# Patient Record
Sex: FEMALE | Race: WHITE | NOT HISPANIC OR LATINO | Employment: PART TIME | URBAN - METROPOLITAN AREA
[De-identification: names, ages, dates, MRNs, and addresses within clinical notes are randomized per-mention and may not be internally consistent; named-entity substitution may affect disease eponyms.]

---

## 2017-02-18 ENCOUNTER — COMMUNICATION - HEALTHEAST (OUTPATIENT)
Dept: FAMILY MEDICINE | Facility: CLINIC | Age: 37
End: 2017-02-18

## 2017-02-18 DIAGNOSIS — R45.86 MOOD CHANGES: ICD-10-CM

## 2017-06-21 ENCOUNTER — COMMUNICATION - HEALTHEAST (OUTPATIENT)
Dept: FAMILY MEDICINE | Facility: CLINIC | Age: 37
End: 2017-06-21

## 2017-06-21 DIAGNOSIS — R45.86 MOOD CHANGES: ICD-10-CM

## 2017-10-19 ENCOUNTER — COMMUNICATION - HEALTHEAST (OUTPATIENT)
Dept: FAMILY MEDICINE | Facility: CLINIC | Age: 37
End: 2017-10-19

## 2017-10-19 DIAGNOSIS — R45.86 MOOD CHANGES: ICD-10-CM

## 2019-07-07 ENCOUNTER — ANESTHESIA - HEALTHEAST (OUTPATIENT)
Dept: OBGYN | Facility: HOSPITAL | Age: 39
End: 2019-07-07

## 2019-07-07 ENCOUNTER — RECORDS - HEALTHEAST (OUTPATIENT)
Dept: ADMINISTRATIVE | Facility: OTHER | Age: 39
End: 2019-07-07

## 2019-07-09 ENCOUNTER — HOME CARE/HOSPICE - HEALTHEAST (OUTPATIENT)
Dept: HOME HEALTH SERVICES | Facility: HOME HEALTH | Age: 39
End: 2019-07-09

## 2019-07-11 ENCOUNTER — HOME CARE/HOSPICE - HEALTHEAST (OUTPATIENT)
Dept: HOME HEALTH SERVICES | Facility: HOME HEALTH | Age: 39
End: 2019-07-11

## 2019-07-12 ENCOUNTER — COMMUNICATION - HEALTHEAST (OUTPATIENT)
Dept: OBGYN | Facility: HOSPITAL | Age: 39
End: 2019-07-12

## 2021-05-30 NOTE — ANESTHESIA POSTPROCEDURE EVALUATION
Patient: Kathy Gross  * No procedures listed *  Anesthesia type: ITN    Patient location: Labor and Delivery  Last vitals: No vitals data found for the desired time range.    Post vital signs: stable  Level of consciousness: awake and responds to simple questions  Post-anesthesia pain: pain controlled  Post-anesthesia nausea and vomiting: no  Pulmonary: unassisted, return to baseline  Cardiovascular: stable and blood pressure at baseline  Hydration: adequate  Anesthetic events: no    QCDR Measures:  ASA# 11 - Hanna-op Cardiac Arrest: ASA11B - Patient did NOT experience unanticipated cardiac arrest  ASA# 12 - Hanna-op Mortality Rate: ASA12B - Patient did NOT die  ASA# 13 - PACU Re-Intubation Rate: ASA13B - Patient did NOT require a new airway mgmt  ASA# 10 - Composite Anes Safety: ASA10A - No serious adverse event    Additional Notes:  Kathy Gross  The patient is status post ITN placement.  The effects of the ITN have worn off.  The patient has no headache, and no back pain.  There is no need for follow up.

## 2021-05-30 NOTE — ANESTHESIA PROCEDURE NOTES
Spinal Block    Patient location during procedure: OB  Start time: 7/7/2019 10:28 PM  End time: 7/7/2019 10:31 PM  Reason for block: labor epidural    Staffing:  Performing  Anesthesiologist: Darion Garcia MD    Preanesthetic Checklist  Completed: patient identified, risks, benefits, and alternatives discussed, timeout performed, consent obtained, at patient's request, airway assessed, oxygen available, suction available, emergency drugs available and hand hygiene performed  Spinal Block  Patient position: right lateral decubitus  Prep: ChloraPrep and site prepped and draped  Patient monitoring: heart rate, cardiac monitor, continuous pulse ox and blood pressure  Approach: midline  Location: L4-5  Injection technique: single-shot  Needle type: pencil-tip   Needle gauge: 24 G

## 2021-05-30 NOTE — TELEPHONE ENCOUNTER
OB Follow Up Phone Call    Patient: Kathy Gross  : 1980  MRN: [id number]     Location  [facility]  Provider Akilah Aguero MD      :   N/A    Language:   English    Discharge Follow-Up:  Follow-Up call by Outreach nurse: Message left for patient    Type of Delivery:      Feeding Method:  Breastfeeding    Comments:   Left message with [facility] Outreach phone number for patient to call back if desired. Reminded patient to schedule postpartum follow-up appointment as directed by PCP at discharge.  Encouraged patient to call clinic/PCP with questions or concerns.    Completed by:   Milvia Christine RN      Patient: Kathy Gross  : 1980  MRN: [id number]

## 2021-05-30 NOTE — ANESTHESIA PREPROCEDURE EVALUATION
Anesthesia Evaluation      Patient summary reviewed   No history of anesthetic complications     Airway   Mallampati: II  Neck ROM: full   Pulmonary - negative ROS and normal exam                          Cardiovascular - negative ROS and normal exam   Neuro/Psych      Comments: migraines    Endo/Other    (+) pregnant     GI/Hepatic/Renal - negative ROS      Other findings: , about to push, requesting ITN      Dental - normal exam                        Anesthesia Plan  Planned anesthetic: ITN    ASA 2     Anesthetic plan and risks discussed with: patient and spouse    Post-op plan: routine recovery

## 2021-05-31 ENCOUNTER — RECORDS - HEALTHEAST (OUTPATIENT)
Dept: ADMINISTRATIVE | Facility: CLINIC | Age: 41
End: 2021-05-31

## 2021-06-01 ENCOUNTER — RECORDS - HEALTHEAST (OUTPATIENT)
Dept: ADMINISTRATIVE | Facility: CLINIC | Age: 41
End: 2021-06-01

## 2021-06-02 ENCOUNTER — RECORDS - HEALTHEAST (OUTPATIENT)
Dept: ADMINISTRATIVE | Facility: CLINIC | Age: 41
End: 2021-06-02

## 2021-07-03 NOTE — ADDENDUM NOTE
Addendum Note by Shazia Hyman at 7/16/2019  3:30 PM     Author: Shazia Hyman Service: -- Author Type: Patient Access    Filed: 7/16/2019  3:30 PM Date of Service: 7/16/2019  3:30 PM Status: Signed    : Shazia Hyman (Patient Access)       Addendum  created 07/16/19 1530 by Shazia Hyman    [facility] saved

## 2021-07-03 NOTE — ADDENDUM NOTE
Addendum Note by Shazia Hyman at 7/15/2019  1:58 PM     Author: Shazia Hyman Service: -- Author Type: Patient Access    Filed: 7/15/2019  1:58 PM Date of Service: 7/15/2019  1:58 PM Status: Signed    : Shazia Hyman (Patient Access)       Addendum  created 07/15/19 1358 by Shazia Hyman    [facility] saved

## 2021-07-03 NOTE — ADDENDUM NOTE
Addendum Note by Asaf Sethi MD at 7/22/2019  1:29 PM     Author: Asaf Sethi MD Service: -- Author Type: Physician    Filed: 7/22/2019  1:29 PM Date of Service: 7/22/2019  1:29 PM Status: Signed    : Asaf Sethi MD (Physician)       Addendum  created 07/22/19 1329 by Asaf Sethi MD    Intraprocedure Blocks edited, Sign clinical note

## 2021-07-03 NOTE — ADDENDUM NOTE
Addendum Note by Shazia Hyman at 7/11/2019 12:26 PM     Author: Shazia Hyman Service: -- Author Type: Patient Access    Filed: 7/11/2019 12:26 PM Date of Service: 7/11/2019 12:26 PM Status: Signed    : Shazia Hyman (Patient Access)       Addendum  created 07/11/19 1226 by Shazia Hyman    [facility] saved

## 2021-07-03 NOTE — ADDENDUM NOTE
Addendum Note by Shazia Hyman at 7/22/2019  2:56 PM     Author: Shzaia Hyman Service: -- Author Type: Patient Access    Filed: 7/22/2019  2:56 PM Date of Service: 7/22/2019  2:56 PM Status: Signed    : Shazia Hyman (Patient Access)       Addendum  created 07/22/19 1456 by Shazia Hyman    [facility] saved

## 2021-07-03 NOTE — ADDENDUM NOTE
Addendum Note by Slick Brooks MD at 7/16/2019  9:56 AM     Author: Slick Brooks MD Service: -- Author Type: Physician    Filed: 7/16/2019  9:56 AM Date of Service: 7/16/2019  9:56 AM Status: Signed    : Slick Brooks MD (Physician)       Addendum  created 07/16/19 0956 by Slick Brooks MD    Child order released for a procedure order, Order Canceled from Note

## 2022-09-13 ENCOUNTER — LAB REQUISITION (OUTPATIENT)
Dept: LAB | Facility: CLINIC | Age: 42
End: 2022-09-13

## 2022-09-13 PROCEDURE — 86481 TB AG RESPONSE T-CELL SUSP: CPT | Performed by: INTERNAL MEDICINE

## 2022-09-15 LAB
GAMMA INTERFERON BACKGROUND BLD IA-ACNC: 0.03 IU/ML
M TB IFN-G BLD-IMP: NEGATIVE
M TB IFN-G CD4+ BCKGRND COR BLD-ACNC: 9.97 IU/ML
MITOGEN IGNF BCKGRD COR BLD-ACNC: -0.01 IU/ML
MITOGEN IGNF BCKGRD COR BLD-ACNC: 0.01 IU/ML
QUANTIFERON MITOGEN: 10 IU/ML
QUANTIFERON NIL TUBE: 0.03 IU/ML
QUANTIFERON TB1 TUBE: 0.04 IU/ML
QUANTIFERON TB2 TUBE: 0.02

## 2023-04-05 ENCOUNTER — LAB REQUISITION (OUTPATIENT)
Dept: LAB | Facility: CLINIC | Age: 43
End: 2023-04-05

## 2023-04-05 DIAGNOSIS — Z01.419 ENCOUNTER FOR GYNECOLOGICAL EXAMINATION (GENERAL) (ROUTINE) WITHOUT ABNORMAL FINDINGS: ICD-10-CM

## 2023-04-05 PROCEDURE — 87624 HPV HI-RISK TYP POOLED RSLT: CPT | Performed by: OBSTETRICS & GYNECOLOGY

## 2023-04-05 PROCEDURE — G0145 SCR C/V CYTO,THINLAYER,RESCR: HCPCS | Performed by: OBSTETRICS & GYNECOLOGY

## 2023-04-07 LAB
BKR LAB AP GYN ADEQUACY: NORMAL
BKR LAB AP GYN INTERPRETATION: NORMAL
BKR LAB AP HPV REFLEX: NORMAL
BKR LAB AP LMP: NORMAL
BKR LAB AP PREVIOUS ABNL DX: NORMAL
BKR LAB AP PREVIOUS ABNORMAL: NORMAL
PATH REPORT.COMMENTS IMP SPEC: NORMAL
PATH REPORT.COMMENTS IMP SPEC: NORMAL
PATH REPORT.RELEVANT HX SPEC: NORMAL

## 2023-04-11 LAB
HUMAN PAPILLOMA VIRUS 16 DNA: NEGATIVE
HUMAN PAPILLOMA VIRUS 18 DNA: NEGATIVE
HUMAN PAPILLOMA VIRUS FINAL DIAGNOSIS: NORMAL
HUMAN PAPILLOMA VIRUS OTHER HR: NEGATIVE

## 2023-09-08 ENCOUNTER — IMMUNIZATION (OUTPATIENT)
Dept: FAMILY MEDICINE | Facility: CLINIC | Age: 43
End: 2023-09-08
Payer: COMMERCIAL

## 2023-09-08 DIAGNOSIS — Z23 ENCOUNTER FOR IMMUNIZATION: Primary | ICD-10-CM

## 2023-09-08 PROCEDURE — 99207 PR NO CHARGE NURSE ONLY: CPT

## 2023-09-08 PROCEDURE — 90471 IMMUNIZATION ADMIN: CPT

## 2023-09-08 PROCEDURE — 90686 IIV4 VACC NO PRSV 0.5 ML IM: CPT

## 2023-09-08 NOTE — PROGRESS NOTES
Prior to immunization administration, verified patients identity using patient s name and date of birth. Please see Immunization Activity for additional information.     Screening Questionnaire for Adult Immunization    Are you sick today?   No   Do you have allergies to medications, food, a vaccine component or latex?   No   Have you ever had a serious reaction after receiving a vaccination?   No   Do you have a long-term health problem with heart, lung, kidney, or metabolic disease (e.g., diabetes), asthma, a blood disorder, no spleen, complement component deficiency, a cochlear implant, or a spinal fluid leak?  Are you on long-term aspirin therapy?   No   Do you have cancer, leukemia, HIV/AIDS, or any other immune system problem?   No   Do you have a parent, brother, or sister with an immune system problem?   No   In the past 3 months, have you taken medications that affect  your immune system, such as prednisone, other steroids, or anticancer drugs; drugs for the treatment of rheumatoid arthritis, Crohn s disease, or psoriasis; or have you had radiation treatments?   No   Have you had a seizure, or a brain or other nervous system problem?   No   During the past year, have you received a transfusion of blood or blood    products, or been given immune (gamma) globulin or antiviral drug?   No   For women: Are you pregnant or is there a chance you could become       pregnant during the next month?   No   Have you received any vaccinations in the past 4 weeks?   No     Immunization questionnaire answers were all negative.    I have reviewed the following standing orders:   This patient is due and qualifies for the Influenza vaccine.    Click here for Influenza Vaccine Standing Order    I have reviewed the vaccines inclusion and exclusion criteria; No concerns regarding eligibility.     Patient instructed to remain in clinic for 15 minutes afterwards, and to report any adverse reactions.     Screening performed by  Lydia Villar MA on 9/8/2023 at 1:41 PM.

## 2024-01-12 ENCOUNTER — HOSPITAL ENCOUNTER (EMERGENCY)
Facility: CLINIC | Age: 44
Discharge: HOME | End: 2024-01-12
Attending: NURSE PRACTITIONER | Admitting: NURSE PRACTITIONER
Payer: COMMERCIAL

## 2024-01-12 VITALS
TEMPERATURE: 99 F | RESPIRATION RATE: 18 BRPM | OXYGEN SATURATION: 97 % | DIASTOLIC BLOOD PRESSURE: 76 MMHG | SYSTOLIC BLOOD PRESSURE: 120 MMHG | HEART RATE: 81 BPM

## 2024-01-12 DIAGNOSIS — B96.89 BV (BACTERIAL VAGINOSIS): ICD-10-CM

## 2024-01-12 DIAGNOSIS — N76.0 BV (BACTERIAL VAGINOSIS): ICD-10-CM

## 2024-01-12 DIAGNOSIS — R10.2 PELVIC PAIN: ICD-10-CM

## 2024-01-12 LAB
ALBUMIN UR-MCNC: NEGATIVE MG/DL
APPEARANCE UR: CLEAR
BILIRUB UR QL STRIP: NEGATIVE
CLUE CELLS: ABNORMAL
CLUE CELLS: PRESENT
COLOR UR AUTO: COLORLESS
GLUCOSE UR STRIP-MCNC: NEGATIVE MG/DL
HCG UR QL: NEGATIVE
HGB UR QL STRIP: NEGATIVE
KETONES UR STRIP-MCNC: NEGATIVE MG/DL
LEUKOCYTE ESTERASE UR QL STRIP: NEGATIVE
NITRATE UR QL: NEGATIVE
PH UR STRIP: 6 [PH] (ref 5–7)
RBC URINE: 0 /HPF
SP GR UR STRIP: 1 (ref 1–1.03)
SQUAMOUS EPITHELIAL: <1 /HPF
TRICHOMONAS, WET PREP: ABNORMAL
TRICHOMONAS, WET PREP: ABNORMAL
UROBILINOGEN UR STRIP-MCNC: NORMAL MG/DL
WBC URINE: <1 /HPF
WBC'S/HIGH POWER FIELD, WET PREP: ABNORMAL
WBC'S/HIGH POWER FIELD, WET PREP: ABNORMAL
YEAST, WET PREP: ABNORMAL
YEAST, WET PREP: ABNORMAL

## 2024-01-12 PROCEDURE — 99213 OFFICE O/P EST LOW 20 MIN: CPT | Performed by: NURSE PRACTITIONER

## 2024-01-12 PROCEDURE — 81025 URINE PREGNANCY TEST: CPT | Performed by: NURSE PRACTITIONER

## 2024-01-12 PROCEDURE — 87210 SMEAR WET MOUNT SALINE/INK: CPT | Performed by: NURSE PRACTITIONER

## 2024-01-12 PROCEDURE — 81001 URINALYSIS AUTO W/SCOPE: CPT | Performed by: NURSE PRACTITIONER

## 2024-01-12 PROCEDURE — 87491 CHLMYD TRACH DNA AMP PROBE: CPT | Performed by: NURSE PRACTITIONER

## 2024-01-12 PROCEDURE — G0463 HOSPITAL OUTPT CLINIC VISIT: HCPCS | Performed by: NURSE PRACTITIONER

## 2024-01-12 RX ORDER — METRONIDAZOLE 7.5 MG/G
1 GEL VAGINAL DAILY
Qty: 25 G | Refills: 0 | Status: SHIPPED | OUTPATIENT
Start: 2024-01-12 | End: 2024-01-17

## 2024-01-12 ASSESSMENT — ACTIVITIES OF DAILY LIVING (ADL): ADLS_ACUITY_SCORE: 33

## 2024-01-12 NOTE — DISCHARGE INSTRUCTIONS
Metrogel is ordered for you to use at bedtime for 5 days to treat bacterial vaginosis. If symptoms are not resolved or they worsen I have ordered an vaginal ultrasound.  You of noted that you would contact a primary care clinic to be seen to establish care recommend following with them as well.  Contact us if you have any questions or need further advice.  Your urine does not look like you have a bladder infection.

## 2024-01-12 NOTE — ED PROVIDER NOTES
ED Provider Note  [facility]      History     Chief Complaint   Patient presents with    Abdominal Pain     HPI  Kathy Gross is a 43 year old female who presents with low pelvic pain for 1-2 days.  Denies any nausea, vomiting, diarrhea.  Denies possibility of pregnancy, STD exposure.  Denies any vaginal drainage, itching or pain.  Does not currently have a primary care provider but reports that she plans to be followed in the [facility] or [facility] system in the near future.  Reports that she is due to begin menstruation cycle in the next couple days.  Denies any pain with urination, no obvious blood in her urine.             Allergies:  No Known Allergies    Problem List:    Patient Active Problem List    Diagnosis Date Noted    Allergic Rhinitis      Priority: Medium     Created by Conversion  Replacement Utility updated for latest IMO load        Migraine Headache      Priority: Medium     Created by Conversion  Replacement Utility updated for latest IMO load        Herpes Simplex Type II      Priority: Medium     Created by Conversion  Replacement Utility updated for latest IMO load        Pregnant 01/21/2015     Priority: Medium    Postpartum Depression      Priority: Medium     Created by Conversion        Acute Sinusitis      Priority: Medium     Created by Conversion        Keloid Scar      Priority: Medium     Created by Conversion        Bunion      Priority: Medium     Created by Conversion  [facility] Annotation: Feb 26 2008  2:39PM - Aleks Kim: mild            Past Medical History:    No past medical history on file.    Past Surgical History:    Past Surgical History:   Procedure Laterality Date    CONIZATION CERVIX,LOOP ELECTRD      Description: Cervical Conization Loop Electrode Excision;  Recorded: 09/29/2008;  Comments: About 2004    WISDOM TOOTH EXTRACTION Bilateral 2008       Family History:    Family History   Problem Relation Age of Onset    No Known  Problems Mother     No Known Problems Father        Social History:  Marital Status:   [2]  Social History     Tobacco Use    Smoking status: Former     Packs/day: .25     Types: Cigarettes     Quit date: 2007     Years since quittin.9    Smokeless tobacco: Never   Substance Use Topics    Alcohol use: No    Drug use: No        Medications:    metroNIDAZOLE (METROGEL) 0.75 % vaginal gel  citalopram (CELEXA) 20 MG tablet  omeprazole (PRILOSEC) 20 MG capsule  prenatal vitamin iron-folic acid 27mg-0.8mg (PRENATAL S) 27 mg iron- 800 mcg Tab tablet          Review of Systems  A medically appropriate review of systems was performed with pertinent positives and negatives noted in the HPI, and all other systems negative.    Physical Exam   Patient Vitals for the past 24 hrs:   BP Temp Temp src Pulse Resp SpO2   24 1332 120/76 99  F (37.2  C) Tympanic 81 18 97 %          Physical Exam  General: alert and in no acute distress on arrival  Head: atraumatic, normocephalic  Lungs:  nonlabored  CV:  extremities warm and perfused  Abd: nondistended, NT upper abdomen, no guarding. No flank or CVA tenderness.  : normal external genitalia, vaginal vault has yellow/white thick secretions present, non-friable cervix, no cervical motion tenderness.  Skin: no rashes, no diaphoresis and skin color normal  Neuro: Patient awake, alert, speech is fluent, appropriate historian.  Psychiatric: affect/mood normal, pleasant.      ED Course              ED Course as of 24   1448 WBCs/high power field(!): 1+     Procedures                    Results for orders placed or performed during the hospital encounter of 24 (from the past 24 hour(s))   UA with Microscopic reflex to Culture    Specimen: Urine, Clean Catch   Result Value Ref Range    Color Urine Colorless Colorless, Straw, Light Yellow, Yellow    Appearance Urine Clear Clear    Glucose Urine Negative Negative mg/dL    Bilirubin Urine  Negative Negative    Ketones Urine Negative Negative mg/dL    Specific Gravity Urine 1.003 1.003 - 1.035    Blood Urine Negative Negative    pH Urine 6.0 5.0 - 7.0    Protein Albumin Urine Negative Negative mg/dL    Urobilinogen Urine Normal Normal, 2.0 mg/dL    Nitrite Urine Negative Negative    Leukocyte Esterase Urine Negative Negative    RBC Urine 0 <=2 /HPF    WBC Urine <1 <=5 /HPF    Squamous Epithelials Urine <1 <=1 /HPF    Narrative    Urine Culture not indicated   HCG qualitative urine   Result Value Ref Range    hCG Urine Qualitative Negative Negative   Wet prep    Specimen: Vagina; Swab   Result Value Ref Range    Trichomonas Absent Absent    Yeast Absent Absent    Clue Cells Absent Absent    WBCs/high power field 1+ (A) None   Wet prep    Specimen: Vagina; Swab   Result Value Ref Range    Trichomonas Absent Absent    Yeast Absent Absent    Clue Cells Present (A) Absent    WBCs/high power field 3+ (A) None       MEDICATIONS GIVEN IN THE EMERGENCY DEPARTMENT:  Medications - No data to display             Assessments & Plan (with Medical Decision Making)  43 year old female who presents to the Urgent Care for evaluation of bacterial vaginosis.  Wet prep sent up to lab however results not consistent with vaginal fluid seen on exam, we collected and sent to the lab these tested positive for clue cells.  No cervical motion tenderness, other physical exam normal, ordered a outpatient ultrasound which she can schedule if symptoms continue before she can be scheduled with her primary care provider.  Metronidazole gel ordered once daily at bedtime vaginally for 5 days.  Advised schedule return if symptoms or not resolved after 5 days to schedule the vaginal ultrasound.  STD testing is pending.  hCG testing was negative.       I have reviewed the nursing notes.    I have reviewed the findings, diagnosis, plan and need for follow up with the patient.        NEW PRESCRIPTIONS STARTED AT TODAY'S ER VISIT  Discharge  Medication List as of 1/12/2024  3:37 PM        START taking these medications    Details   metroNIDAZOLE (METROGEL) 0.75 % vaginal gel Place 1 applicator (5 g) vaginally daily for 5 daysDisp-25 g, [id number]             Final diagnoses:   Pelvic pain   BV (bacterial vaginosis)       1/12/2024   [facility] EMERGENCY DEPT       Milvia Long APRN CNP  01/12/24 2041

## 2024-01-13 LAB
C TRACH DNA SPEC QL PROBE+SIG AMP: NEGATIVE
N GONORRHOEA DNA SPEC QL NAA+PROBE: NEGATIVE

## 2024-01-14 NOTE — RESULT ENCOUNTER NOTE
Final result for both N. Gonorrhoeae PCR and Chlamydia Trachomatis PCR are NEGATIVE.  No treatment or change in treatment per [facility] ED Lab Result N. Gonorrhea AND/OR Chlamydia T. protocol.   Yes

## 2024-07-01 ENCOUNTER — PATIENT OUTREACH (OUTPATIENT)
Dept: CARE COORDINATION | Facility: CLINIC | Age: 44
End: 2024-07-01
Payer: COMMERCIAL

## 2024-07-15 ENCOUNTER — PATIENT OUTREACH (OUTPATIENT)
Dept: CARE COORDINATION | Facility: CLINIC | Age: 44
End: 2024-07-15
Payer: COMMERCIAL

## 2024-07-16 ENCOUNTER — PATIENT OUTREACH (OUTPATIENT)
Dept: CARE COORDINATION | Facility: CLINIC | Age: 44
End: 2024-07-16
Payer: COMMERCIAL

## 2024-08-13 ENCOUNTER — PATIENT OUTREACH (OUTPATIENT)
Dept: CARE COORDINATION | Facility: CLINIC | Age: 44
End: 2024-08-13
Payer: COMMERCIAL

## 2024-09-28 ENCOUNTER — HEALTH MAINTENANCE LETTER (OUTPATIENT)
Age: 44
End: 2024-09-28

## 2024-10-29 ENCOUNTER — PATIENT OUTREACH (OUTPATIENT)
Dept: CARE COORDINATION | Facility: CLINIC | Age: 44
End: 2024-10-29
Payer: COMMERCIAL

## 2024-12-02 ENCOUNTER — OFFICE VISIT (OUTPATIENT)
Dept: FAMILY MEDICINE | Facility: CLINIC | Age: 44
End: 2024-12-02
Payer: COMMERCIAL

## 2024-12-02 ENCOUNTER — ANCILLARY PROCEDURE (OUTPATIENT)
Dept: ULTRASOUND IMAGING | Facility: CLINIC | Age: 44
End: 2024-12-02
Attending: NURSE PRACTITIONER
Payer: COMMERCIAL

## 2024-12-02 VITALS
SYSTOLIC BLOOD PRESSURE: 124 MMHG | DIASTOLIC BLOOD PRESSURE: 74 MMHG | OXYGEN SATURATION: 98 % | BODY MASS INDEX: 31.64 KG/M2 | TEMPERATURE: 97.7 F | HEART RATE: 67 BPM | RESPIRATION RATE: 18 BRPM | HEIGHT: 63 IN | WEIGHT: 178.6 LBS

## 2024-12-02 DIAGNOSIS — K64.4 EXTERNAL HEMORRHOIDS: ICD-10-CM

## 2024-12-02 DIAGNOSIS — Z13.1 SCREENING FOR DIABETES MELLITUS: ICD-10-CM

## 2024-12-02 DIAGNOSIS — Z82.49 FAMILY HISTORY OF ISCHEMIC HEART DISEASE: ICD-10-CM

## 2024-12-02 DIAGNOSIS — A60.00 GENITAL HERPES SIMPLEX, UNSPECIFIED SITE: ICD-10-CM

## 2024-12-02 DIAGNOSIS — G43.009 MIGRAINE WITHOUT AURA AND WITHOUT STATUS MIGRAINOSUS, NOT INTRACTABLE: ICD-10-CM

## 2024-12-02 DIAGNOSIS — E78.5 HYPERLIPIDEMIA LDL GOAL <100: ICD-10-CM

## 2024-12-02 DIAGNOSIS — F32.5 MAJOR DEPRESSIVE DISORDER WITH SINGLE EPISODE, IN FULL REMISSION (H): ICD-10-CM

## 2024-12-02 DIAGNOSIS — Z79.899 MEDICATION MANAGEMENT: ICD-10-CM

## 2024-12-02 DIAGNOSIS — Z12.31 ENCOUNTER FOR SCREENING MAMMOGRAM FOR BREAST CANCER: ICD-10-CM

## 2024-12-02 DIAGNOSIS — N92.0 MENORRHAGIA WITH REGULAR CYCLE: ICD-10-CM

## 2024-12-02 DIAGNOSIS — Z12.31 VISIT FOR SCREENING MAMMOGRAM: Primary | ICD-10-CM

## 2024-12-02 DIAGNOSIS — R10.2 PELVIC PAIN: ICD-10-CM

## 2024-12-02 DIAGNOSIS — Z80.3 FAMILY HISTORY OF MALIGNANT NEOPLASM OF BREAST: ICD-10-CM

## 2024-12-02 DIAGNOSIS — F41.9 ANXIETY: ICD-10-CM

## 2024-12-02 PROCEDURE — 90480 ADMN SARSCOV2 VAC 1/ONLY CMP: CPT | Performed by: NURSE PRACTITIONER

## 2024-12-02 PROCEDURE — 76830 TRANSVAGINAL US NON-OB: CPT | Mod: TC | Performed by: RADIOLOGY

## 2024-12-02 PROCEDURE — 99204 OFFICE O/P NEW MOD 45 MIN: CPT | Performed by: NURSE PRACTITIONER

## 2024-12-02 PROCEDURE — G2211 COMPLEX E/M VISIT ADD ON: HCPCS | Performed by: NURSE PRACTITIONER

## 2024-12-02 PROCEDURE — 76856 US EXAM PELVIC COMPLETE: CPT | Mod: TC | Performed by: RADIOLOGY

## 2024-12-02 PROCEDURE — 91320 SARSCV2 VAC 30MCG TRS-SUC IM: CPT | Performed by: NURSE PRACTITIONER

## 2024-12-02 RX ORDER — BUPROPION HYDROCHLORIDE 150 MG/1
150 TABLET ORAL EVERY MORNING
Qty: 90 TABLET | Refills: 0 | Status: SHIPPED | OUTPATIENT
Start: 2024-12-02

## 2024-12-02 RX ORDER — VALACYCLOVIR HYDROCHLORIDE 500 MG/1
500 TABLET, FILM COATED ORAL DAILY
Qty: 90 TABLET | Refills: 3 | Status: SHIPPED | OUTPATIENT
Start: 2024-12-02

## 2024-12-02 RX ORDER — SUMATRIPTAN SUCCINATE 100 MG/1
100 TABLET ORAL
Qty: 14 TABLET | Refills: 1 | Status: SHIPPED | OUTPATIENT
Start: 2024-12-02

## 2024-12-02 RX ORDER — CITALOPRAM HYDROBROMIDE 40 MG/1
40 TABLET ORAL DAILY
Qty: 90 TABLET | Refills: 3 | Status: SHIPPED | OUTPATIENT
Start: 2024-12-02

## 2024-12-02 NOTE — PROGRESS NOTES
Assessment & Plan     Major depressive disorder with single episode, in full remission (H)  Stable-doing well currently.    Encounter for screening mammogram for breast cancer  Order placed, plans to set up per self  - MA Screening Bilateral w/ Pilo; Future  - MA Screening Bilateral w/ Pilo; Future    Family history of malignant neoplasm of breast  Order placed, plans to set up per self  - MA Screening Bilateral w/ Pilo; Future  - MA Screening Bilateral w/ Pilo; Future    Menorrhagia with regular cycle  Will plan to obtain ultrasound.  Full plan will depend on outcome.  Has had IUD in the past and is interested in having again.  Has appointment with gynecology in a few weeks-plans to discuss with them as well.  - US Pelvic Complete with Transvaginal; Future    Anxiety  Worsening recently.  Plan to continue citalopram at current dose.  Will check vitamin D level here today.  Will plan to add Wellbutrin.  Treatment plan and medications reviewed and understood by the patient   Risks, benefits and potential side effects (including sexual dysfunction and suicidal thoughs) of antidepressant/antianxiety medication reviewed with patient  Reviewed the importance of medication compliance  Instructed to call or return with:  Worsening symptoms  Suicidal/homicidial ideation  Hallucinations or Delusions  Medication side effects   Plan to follow up in 2 months  - buPROPion (WELLBUTRIN XL) 150 MG 24 hr tablet; Take 1 tablet (150 mg) by mouth every morning.  - Vitamin D Deficiency; Future  - citalopram (CELEXA) 40 MG tablet; Take 1 tablet (40 mg) by mouth daily.    Hyperlipidemia LDL goal <100  Will arrange for fasting lab appointment.- Apolipoprotein B; Future  - Lipoprotein (a); Future  - Lipid panel reflex to direct LDL Fasting; Future    Family history of ischemic heart disease  - Apolipoprotein B; Future  - Lipoprotein (a); Future  - Lipid panel reflex to direct LDL Fasting; Future    Migraine without aura and without status  "migrainosus, not intractable  Push fluids  Try and decrease stress  Get plenty of rest  Take over the counter tylenol or ibuprofen as needed  Educated regarding warning signs to watch for.   - SUMAtriptan (IMITREX) 100 MG tablet; Take 1 tablet (100 mg) by mouth at onset of headache for migraine. May repeat after 2 hours if needed    Screening for diabetes mellitus  Plan fasting lab appointment.  - Glucose; Future    External hemorrhoids  Will refer to colorectal surgery for removal  - Adult Colorectal Surgery  Referral; Future    Genital herpes simplex, unspecified site  No recent outbreaks.  Plan to continue Valtrex at current dose.  Did briefly discuss discontinuing Valtrex in the future.  Will revisit at follow-up appointment.  - valACYclovir (VALTREX) 500 MG tablet; Take 1 tablet (500 mg) by mouth daily.    The longitudinal plan of care for the diagnosis(es)/condition(s) as documented were addressed during this visit. Due to the added complexity in care, I will continue to support Kathy in the subsequent management and with ongoing continuity of care.       BMI  Estimated body mass index is 32.05 kg/m  as calculated from the following:    Height as of this encounter: 1.59 m (5' 2.6\").    Weight as of this encounter: 81 kg (178 lb 9.6 oz).         Vicky Chavez is a 44 year old, presenting for the following health issues:  [facility]      12/2/2024     9:38 AM   Additional Questions   Roomed by Bri CORDOVA         12/2/2024     9:39 AM   Patient Reported Additional Medications   Patient reports taking the following new medications sumatriptian - 100mg, 2 in 24 hrs. valyclovior 500 mg everyday.     Via the Health Maintenance questionnaire, the patient has reported the following services have been completed -Mammogram: [facility] 2022-01-01, this information has been sent to the abstraction team.  History of Present Illness       Reason for visit:  Yearly exam She is missing 1 dose(s) of " medications per week.     Citalopram is 40 mg currently, not the 20mg.   I informed pt that we do establish care and physicals in separate visits.         Here today to establish care.  Had previously been receiving health care through [facility].  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  Goes to the gym a couple of times per week.  Plans to start going more frequently  And doing lifting.    Since 2012 has been taking citalopram. Is currently taking 40 mg. Is currently taking for anxiety. Has tried going off it and anxiety got much worse. Was originally on it for post partum depression.  Feels like no longer has any depression.  Does need refills. No negative side effects from med that is aware of. Has been on the 40 mg dose for at least 5 years.     Wonders if is going through perimenopause. Feels like has more anxiety than has in the past. Is sleeping okay. Takes 1 mg of melatonin at night. Will wake up at least once per night due to child in room. More irrtable and anxoius. Feels less motivated and scatterbrained.    Period  is heavier than used to be and will have period for a couple of days then will be gone for a day and then come back for another 3-4 days. Period is still coming regular. Period has been tis way for the last 6-12 months. No hot flashes or vaginal dryness.     Mother with breast cancer-non hormonal.     Father has autosomal dominant CANVAS-balance, cerbellar smaller, ataxia, neuropathy. Was told that could be screenined but has not    Takes Imitrex 100 for migranes. Is getting them more than used to be. Can sometimes tell when they are coming. Does have some zofran if needed. Pain will start in neck and then come over ear and up to forehead.     Takes Valcyclovir daily 500.  Has history of vaginal herpes.  Has not had an outbreak for years.     Has external hemorrhoid that will bleed at times. Has not been thrombosed ever bleeding on paper or in toilet.  "Occasional constipation. Occurs more with constipation.         Objective    /74   Pulse 67   Temp 97.7  F (36.5  C) (Temporal)   Resp 18   Ht 1.59 m (5' 2.6\")   Wt 81 kg (178 lb 9.6 oz)   LMP 11/29/2024 (Approximate)   SpO2 98%   BMI 32.05 kg/m    Body mass index is 32.05 kg/m .  Physical Exam  Constitutional:       Appearance: Normal appearance. She is well-developed.   HENT:      Head: Normocephalic and atraumatic.      Right Ear: Tympanic membrane and external ear normal. No middle ear effusion.      Left Ear: Tympanic membrane and external ear normal.  No middle ear effusion.      Nose: No mucosal edema.   Neck:      Thyroid: No thyromegaly.      Vascular: No carotid bruit.   Cardiovascular:      Rate and Rhythm: Normal rate and regular rhythm.      Heart sounds: Normal heart sounds.   Pulmonary:      Effort: Pulmonary effort is normal.      Breath sounds: Normal breath sounds.   Abdominal:      General: Bowel sounds are normal.      Palpations: Abdomen is soft.   Skin:     General: Skin is warm and dry.   Neurological:      Mental Status: She is alert.   Psychiatric:         Behavior: Behavior normal.            Signed Electronically by: STAN Redmond CNP    "

## 2024-12-02 NOTE — PATIENT INSTRUCTIONS
You can call imaging scheduling to set up appointment date, time, and location that works best for you to have transvaginal US and mammogram [phone]    It was a pleasure to meet you today!    Will arrange for fasting lab appointment with the advanced cholesterol testing.    Will chat again in a few weeks to see how you are doing with the addition of the Wellbutrin.

## 2024-12-03 ENCOUNTER — PATIENT OUTREACH (OUTPATIENT)
Dept: CARE COORDINATION | Facility: CLINIC | Age: 44
End: 2024-12-03
Payer: COMMERCIAL

## 2024-12-05 ENCOUNTER — PATIENT OUTREACH (OUTPATIENT)
Dept: CARE COORDINATION | Facility: CLINIC | Age: 44
End: 2024-12-05
Payer: COMMERCIAL

## 2024-12-07 ENCOUNTER — HEALTH MAINTENANCE LETTER (OUTPATIENT)
Age: 44
End: 2024-12-07

## 2024-12-17 ENCOUNTER — LAB (OUTPATIENT)
Dept: LAB | Facility: CLINIC | Age: 44
End: 2024-12-17
Payer: COMMERCIAL

## 2024-12-17 DIAGNOSIS — Z82.49 FAMILY HISTORY OF ISCHEMIC HEART DISEASE: ICD-10-CM

## 2024-12-17 DIAGNOSIS — E78.5 HYPERLIPIDEMIA LDL GOAL <100: ICD-10-CM

## 2024-12-17 DIAGNOSIS — Z13.1 SCREENING FOR DIABETES MELLITUS: ICD-10-CM

## 2024-12-17 DIAGNOSIS — F41.9 ANXIETY: ICD-10-CM

## 2024-12-17 LAB
APO A-I SERPL-MCNC: 10 MG/DL
CHOLEST SERPL-MCNC: 219 MG/DL
FASTING STATUS PATIENT QL REPORTED: YES
FASTING STATUS PATIENT QL REPORTED: YES
GLUCOSE SERPL-MCNC: 92 MG/DL (ref 70–99)
HDLC SERPL-MCNC: 76 MG/DL
LDLC SERPL CALC-MCNC: 134 MG/DL
NONHDLC SERPL-MCNC: 143 MG/DL
TRIGL SERPL-MCNC: 44 MG/DL
VIT D+METAB SERPL-MCNC: 36 NG/ML (ref 20–50)

## 2024-12-17 PROCEDURE — 82306 VITAMIN D 25 HYDROXY: CPT

## 2024-12-17 PROCEDURE — 80061 LIPID PANEL: CPT

## 2024-12-17 PROCEDURE — 99000 SPECIMEN HANDLING OFFICE-LAB: CPT

## 2024-12-17 PROCEDURE — 83695 ASSAY OF LIPOPROTEIN(A): CPT

## 2024-12-17 PROCEDURE — 82172 ASSAY OF APOLIPOPROTEIN: CPT | Mod: 90

## 2024-12-17 PROCEDURE — 82947 ASSAY GLUCOSE BLOOD QUANT: CPT

## 2024-12-17 PROCEDURE — 36415 COLL VENOUS BLD VENIPUNCTURE: CPT

## 2024-12-18 LAB — APO B100 SERPL-MCNC: 99 MG/DL

## 2025-01-10 ENCOUNTER — ANCILLARY PROCEDURE (OUTPATIENT)
Dept: MAMMOGRAPHY | Facility: HOSPITAL | Age: 45
End: 2025-01-10
Attending: NURSE PRACTITIONER
Payer: COMMERCIAL

## 2025-01-10 DIAGNOSIS — Z12.31 ENCOUNTER FOR SCREENING MAMMOGRAM FOR BREAST CANCER: ICD-10-CM

## 2025-01-10 DIAGNOSIS — Z80.3 FAMILY HISTORY OF MALIGNANT NEOPLASM OF BREAST: ICD-10-CM

## 2025-01-10 PROCEDURE — 77063 BREAST TOMOSYNTHESIS BI: CPT

## 2025-01-17 ENCOUNTER — ANCILLARY PROCEDURE (OUTPATIENT)
Dept: MAMMOGRAPHY | Facility: CLINIC | Age: 45
End: 2025-01-17
Attending: NURSE PRACTITIONER
Payer: COMMERCIAL

## 2025-01-17 DIAGNOSIS — R92.8 ABNORMAL MAMMOGRAM: ICD-10-CM

## 2025-01-17 PROCEDURE — 77065 DX MAMMO INCL CAD UNI: CPT | Mod: LT

## 2025-01-17 PROCEDURE — 76642 ULTRASOUND BREAST LIMITED: CPT | Mod: LT

## 2025-01-22 ENCOUNTER — VIRTUAL VISIT (OUTPATIENT)
Dept: FAMILY MEDICINE | Facility: CLINIC | Age: 45
End: 2025-01-22
Payer: COMMERCIAL

## 2025-01-22 DIAGNOSIS — F41.9 ANXIETY: ICD-10-CM

## 2025-01-22 PROCEDURE — 98005 SYNCH AUDIO-VIDEO EST LOW 20: CPT | Performed by: NURSE PRACTITIONER

## 2025-01-22 RX ORDER — BUPROPION HYDROCHLORIDE 150 MG/1
150 TABLET ORAL EVERY MORNING
Qty: 90 TABLET | Refills: 2 | Status: SHIPPED | OUTPATIENT
Start: 2025-01-22

## 2025-01-22 ASSESSMENT — PATIENT HEALTH QUESTIONNAIRE - PHQ9
SUM OF ALL RESPONSES TO PHQ QUESTIONS 1-9: 2
SUM OF ALL RESPONSES TO PHQ QUESTIONS 1-9: 2
10. IF YOU CHECKED OFF ANY PROBLEMS, HOW DIFFICULT HAVE THESE PROBLEMS MADE IT FOR YOU TO DO YOUR WORK, TAKE CARE OF THINGS AT HOME, OR GET ALONG WITH OTHER PEOPLE: NOT DIFFICULT AT ALL

## 2025-01-22 ASSESSMENT — ANXIETY QUESTIONNAIRES
GAD7 TOTAL SCORE: 1
GAD7 TOTAL SCORE: 1
2. NOT BEING ABLE TO STOP OR CONTROL WORRYING: NOT AT ALL
IF YOU CHECKED OFF ANY PROBLEMS ON THIS QUESTIONNAIRE, HOW DIFFICULT HAVE THESE PROBLEMS MADE IT FOR YOU TO DO YOUR WORK, TAKE CARE OF THINGS AT HOME, OR GET ALONG WITH OTHER PEOPLE: NOT DIFFICULT AT ALL
8. IF YOU CHECKED OFF ANY PROBLEMS, HOW DIFFICULT HAVE THESE MADE IT FOR YOU TO DO YOUR WORK, TAKE CARE OF THINGS AT HOME, OR GET ALONG WITH OTHER PEOPLE?: NOT DIFFICULT AT ALL
6. BECOMING EASILY ANNOYED OR IRRITABLE: NOT AT ALL
7. FEELING AFRAID AS IF SOMETHING AWFUL MIGHT HAPPEN: NOT AT ALL
5. BEING SO RESTLESS THAT IT IS HARD TO SIT STILL: NOT AT ALL
3. WORRYING TOO MUCH ABOUT DIFFERENT THINGS: SEVERAL DAYS
7. FEELING AFRAID AS IF SOMETHING AWFUL MIGHT HAPPEN: NOT AT ALL
4. TROUBLE RELAXING: NOT AT ALL
GAD7 TOTAL SCORE: 1
1. FEELING NERVOUS, ANXIOUS, OR ON EDGE: NOT AT ALL

## 2025-01-22 NOTE — NURSING NOTE
Per mychart msg response on 01/22/25.  Adding in additional questionnaire patient responded to.       Never Rarely Sometimes Often Very Often   1 How often do you have trouble wrapping up the final details of a project once the challenging parts have been done?        2 How often do you have difficulty getting things in order when you have to do a task that requires organization?        3 How often do you have problems remembering appointments or obligations?        4 When you have a task that requires a lot of thought, how often do you avoid or delay getting started?        5 How often do you fidget or squirm with your hands or feet when you have to sit down for a long time?        6 How often do you feel overly active and compelled to do things, like you were driven by a motor?        7 How often do you make careless mistakes when you have to work on a boring or difficult project?        8 How often do you have difficulty keeping your attention when you are doing boring or repetitive work?        9 How often do you have difficulty concentrating on what people say to you, even when they are speaking to you directly?        10 How often do you misplace or have difficulty finding things at home or at work?        11 How often are you distracted by activity or noise around you?        12 How often do you leave your seat in meetings or other situations in which you are expected to remain seated?        13 How often do you feel restless or fidgety?        14 How often do you have difficulty unwinding and relaxing when you have time to yourself?        15 How often do you find yourself talking too much when you are in social situations?        16 When you're in a conversation, how often do you find yourself finishing the sentences of the people you are talking to, before they can finish them themselves?        17 How often do you have difficulty waiting your turn in situations when turn-taking is required?        18 How  often do you interrupt others when they are busy?          How often do you have trouble wrapping up the final details of a project once the challenging parts have been done? Sometimes   How often do you have difficulty getting things in order when you have to do a task that requires organization? Sometimes   How often do you have problems remembering appointments or obligations? Sometimes   When you have a task that requires a lot of thought, how often do you avoid or delay getting started? Freq   How often do you fidget or squirm with your hands or feet when you have to sit down for a long time? Frequent   How often do you feel overly active and compelled to do things, like you were driven by a motor? Never   How often do you make careless mistakes when you have to work on a boring or difficult project? Occasionally   How often do you have difficulty keeping your attention when you are doing boring or repetitive work? Frequently   How often do you have difficulty concentrating on what people say to you, even when they are speaking to you directly?sometimes   How often do you misplace or have difficulty finding things at home or at work?frequently   How often are you distracted by activity or noise around you? Frequently  How often do you leave your seat in meetings or other situations in which you are expected to remain seated? Frequently   How often do you feel restless or fidgety? Sometimes   How often do  when they are busy? Frequently     Mariela Schwartz MA

## 2025-01-22 NOTE — PROGRESS NOTES
"Kathy is a 44 year old who is being evaluated via a billable video visit.    How would you like to obtain your AVS? MyChart  If the video visit is dropped, the invitation should be resent by: Text to cell phone: [phone]  Will anyone else be joining your video visit? No      Assessment & Plan     Anxiety  Improved with addition of Wellbutrin.  Plan to continue at current dose.  Continue citalopram as well.  Plan to follow-up at annual exam in December.  May reach out sooner if needed.  Refill sent  - buPROPion (WELLBUTRIN XL) 150 MG 24 hr tablet; Take 1 tablet (150 mg) by mouth every morning.          BMI  Estimated body mass index is 32.05 kg/m  as calculated from the following:    Height as of 12/2/24: 1.59 m (5' 2.6\").    Weight as of 12/2/24: 81 kg (178 lb 9.6 oz).         Subjective   Kathy is a 44 year old, presenting for the following health issues:  Recheck Medication        1/22/2025     8:02 AM   Additional Questions   Roomed by Patient completed intake prior to visit   Accompanied by n/a         1/22/2025     8:02 AM   Patient Reported Additional Medications   Patient reports taking the following new medications n/a     History of Present Illness       Mental Health Follow-up:  Patient presents to follow-up on Anxiety.    Patient's anxiety since last visit has been:  Better  The patient is not having other symptoms associated with anxiety.  Any significant life events: No  Patient is not feeling anxious or having panic attacks.  Patient has no concerns about alcohol or drug use.    She eats 2-3 servings of fruits and vegetables daily.She consumes 0 sweetened beverage(s) daily.She exercises with enough effort to increase her heart rate 30 to 60 minutes per day.  She exercises with enough effort to increase her heart rate 4 days per week.   She is taking medications regularly.       At last appointment  was started on Wellbutrin in addition to citalopram to help control anxiety.  Anxiety is much " less.  Is feeling much better.  Is not having any negative side effects from the Wellbutrin.  Sleep has been good.  Feels like is very stable and where wants to be.  Would like to continue medications at current dosage.    Please answer the questions below, rating yourself on each of the criteria shown using the scale on the right side of the page. As you answer each question, place an X in the box that best describes how you have felt and conducted yourself over the past 6 months.       Never Rarely Sometimes Often Very Often   1 How often do you have trouble wrapping up the final details of a project once the challenging parts have been done?        2 How often do you have difficulty getting things in order when you have to do a task that requires organization?        3 How often do you have problems remembering appointments or obligations?        4 When you have a task that requires a lot of thought, how often do you avoid or delay getting started?        5 How often do you fidget or squirm with your hands or feet when you have to sit down for a long time?        6 How often do you feel overly active and compelled to do things, like you were driven by a motor?        7 How often do you make careless mistakes when you have to work on a boring or difficult project?        8 How often do you have difficulty keeping your attention when you are doing boring or repetitive work?        9 How often do you have difficulty concentrating on what people say to you, even when they are speaking to you directly?        10 How often do you misplace or have difficulty finding things at home or at work?        11 How often are you distracted by activity or noise around you?        12 How often do you leave your seat in meetings or other situations in which you are expected to remain seated?        13 How often do you feel restless or fidgety?        14 How often do you have difficulty unwinding and relaxing when you have time to  yourself?        15 How often do you find yourself talking too much when you are in social situations?        16 When you're in a conversation, how often do you find yourself finishing the sentences of the people you are talking to, before they can finish them themselves?        17 How often do you have difficulty waiting your turn in situations when turn-taking is required?        18 How often do you interrupt others when they are busy?          How often do you have trouble wrapping up the final details of a project once the challenging parts have been done? Sometimes   How often do you have difficulty getting things in order when you have to do a task that requires organization? Sometimes   How often do you have problems remembering appointments or obligations? Sometimes   When you have a task that requires a lot of thought, how often do you avoid or delay getting started? Freq   How often do you fidget or squirm with your hands or feet when you have to sit down for a long time? Frequent   How often do you feel overly active and compelled to do things, like you were driven by a motor? Never   How often do you make careless mistakes when you have to work on a boring or difficult project? Occasionally   How often do you have difficulty keeping your attention when you are doing boring or repetitive work? Frequently   How often do you have difficulty concentrating on what people say to you, even when they are speaking to you directly?sometimes   How often do you misplace or have difficulty finding things at home or at work?frequently   How often are you distracted by activity or noise around you? Frequently  How often do you leave your seat in meetings or other situations in which you are expected to remain seated? Frequently   How often do you feel restless or fidgety? Sometimes   How often do  when they are busy? Frequently         Objective           Vitals:  No vitals were obtained today due to virtual  visit.    Physical Exam  Constitutional:       Appearance: Normal appearance.   HENT:      Nose: No congestion.   Eyes:      General: Lids are normal.   Pulmonary:      Effort: No tachypnea, bradypnea or respiratory distress.   Skin:     Coloration: Skin is not ashen, cyanotic, jaundiced or pale.   Neurological:      Mental Status: She is alert.   Psychiatric:         Mood and Affect: Mood normal.         Speech: Speech normal.         Behavior: Behavior normal.              Video-Visit Details    Type of service:  Video Visit   Originating Location (pt. Location): Home    Distant Location (provider location):  On-site  Platform used for Video Visit: Kiara  Signed Electronically by: STAN Redmond CNP

## 2025-05-01 DIAGNOSIS — G43.009 MIGRAINE WITHOUT AURA AND WITHOUT STATUS MIGRAINOSUS, NOT INTRACTABLE: ICD-10-CM

## 2025-05-01 RX ORDER — SUMATRIPTAN SUCCINATE 100 MG/1
TABLET ORAL
Qty: 14 TABLET | Refills: 1 | Status: SHIPPED | OUTPATIENT
Start: 2025-05-01